# Patient Record
Sex: MALE | Race: WHITE | Employment: FULL TIME | URBAN - METROPOLITAN AREA
[De-identification: names, ages, dates, MRNs, and addresses within clinical notes are randomized per-mention and may not be internally consistent; named-entity substitution may affect disease eponyms.]

---

## 2021-07-01 ENCOUNTER — ANCILLARY PROCEDURE (OUTPATIENT)
Dept: GENERAL RADIOLOGY | Facility: CLINIC | Age: 26
End: 2021-07-01
Attending: PHYSICIAN ASSISTANT
Payer: OTHER MISCELLANEOUS

## 2021-07-01 ENCOUNTER — OFFICE VISIT (OUTPATIENT)
Dept: URGENT CARE | Facility: URGENT CARE | Age: 26
End: 2021-07-01
Payer: OTHER MISCELLANEOUS

## 2021-07-01 ENCOUNTER — TELEPHONE (OUTPATIENT)
Dept: URGENT CARE | Facility: URGENT CARE | Age: 26
End: 2021-07-01

## 2021-07-01 VITALS
BODY MASS INDEX: 18.61 KG/M2 | OXYGEN SATURATION: 100 % | SYSTOLIC BLOOD PRESSURE: 122 MMHG | WEIGHT: 145 LBS | DIASTOLIC BLOOD PRESSURE: 67 MMHG | TEMPERATURE: 99 F | HEIGHT: 74 IN | HEART RATE: 76 BPM

## 2021-07-01 DIAGNOSIS — S46.811A STRAIN OF RIGHT SUBSCAPULARIS MUSCLE, INITIAL ENCOUNTER: ICD-10-CM

## 2021-07-01 DIAGNOSIS — M54.9 MID BACK PAIN ON RIGHT SIDE: Primary | ICD-10-CM

## 2021-07-01 PROCEDURE — 99214 OFFICE O/P EST MOD 30 MIN: CPT | Performed by: PHYSICIAN ASSISTANT

## 2021-07-01 PROCEDURE — 72070 X-RAY EXAM THORAC SPINE 2VWS: CPT | Performed by: RADIOLOGY

## 2021-07-01 RX ORDER — METHOCARBAMOL 750 MG/1
750 TABLET, FILM COATED ORAL 4 TIMES DAILY PRN
Qty: 30 TABLET | Refills: 0 | Status: SHIPPED | OUTPATIENT
Start: 2021-07-01

## 2021-07-01 RX ORDER — NAPROXEN 500 MG/1
500 TABLET ORAL 2 TIMES DAILY WITH MEALS
Qty: 30 TABLET | Refills: 3 | Status: SHIPPED | OUTPATIENT
Start: 2021-07-01 | End: 2022-07-01

## 2021-07-01 ASSESSMENT — MIFFLIN-ST. JEOR: SCORE: 1702.47

## 2021-07-01 NOTE — LETTER
[facility] [address] [address]  [phone]        2021    REPORT OF WORK ABILITY    PATIENT DATA  Employee Name: Eric Hein        : 1995   (Not on file)  Work related injury: YES  Today's date: 2021  Date of injury: 2021     PROVIDER EVALUATION: Please fill in as needed.  Please give copy to employee for employer.  1. Diagnosis: Mid back pain/strain  2. Treatment: ice, ROM exercises, medication  3. Medication: robaxin and naprosyn  NOTE: When ordering a medication, [place] Rules require Work Comp or WC on prescriptions.  4. Return to work date: May return to work without restrictions on .        RESTRICTIONS: Unlimited unless listed.  Restrictions apply to home and leisure also.  If work within restrictions is not available, the employee is totally disabled.  Provider comments:   Medical Examiner: Segun Barrera, [facility] PA-C          Next appointment: As needed    CC: Employer, Managed Care Plan/Payor, Patient

## 2021-07-01 NOTE — LETTER
[facility] [address] [address]  [phone]        2021    REPORT OF WORK ABILITY    PATIENT DATA  Employee Name: Eric Hein        : 1995   (Not on file)  Work related injury: YES  Today's date: 2021  Date of injury: 2021     PROVIDER EVALUATION: Please fill in as needed.  Please give copy to employee for employer.  1. Diagnosis: Mid back pain/strain  2. Treatment: ice, ROM exercises, medication  3. Medication: robaxin and naprosyn  NOTE: When ordering a medication, [place] Rules require [place] Comp or WC on prescriptions.  4. Return to work date: May return to work with restrictions on        RESTRICTIONS: Unlimited unless listed.  Restrictions apply to home and leisure also.  If work within restrictions is not available, the employee is totally disabled.  Provider comments:   Medical Examiner: Segun Barrera, [facility] PA-C          Next appointment: As needed    CC: Employer, Managed Care Plan/Payor, Patient

## 2021-07-01 NOTE — PROGRESS NOTES
"    Assessment & Plan     Mid back pain on right side  Ice compresses  ROM exercises  Naprosyn for inflammation  - naproxen (NAPROSYN) 500 MG tablet; Take 1 tablet (500 mg) by mouth 2 times daily (with meals)  - methocarbamol (ROBAXIN) 750 MG tablet; Take 1 tablet (750 mg) by mouth 4 times daily as needed    Strain of right subscapularis muscle, initial encounter  Xray Negative for acute findings, read by Segun LINDA at time of visit.  Naprosyn for inflammation  Robaxin for muscle tension  Work comp letter written for patient  - XR Thoracic Spine 2 Views  - naproxen (NAPROSYN) 500 MG tablet; Take 1 tablet (500 mg) by mouth 2 times daily (with meals)  - methocarbamol (ROBAXIN) 750 MG tablet; Take 1 tablet (750 mg) by mouth 4 times daily as needed       Follow up with PCP if symptoms persist    Segun Barrera PA-C  [facility] NADIA Reyes is a 25 year old who presents for the following health issues     HPI     Work comp injury  Right mid back strain, tenderness    Review of Systems   Constitutional, HEENT, cardiovascular, pulmonary, gi and gu systems are negative, except as otherwise noted.      Objective    /67   Pulse 76   Temp 99  F (37.2  C)   Ht 1.88 m (6' 2\")   Wt 65.8 kg (145 lb)   SpO2 100%   BMI 18.62 kg/m    Body mass index is 18.62 kg/m .  Physical Exam   GENERAL: healthy, alert and no distress  NECK: no adenopathy, no asymmetry, masses, or scars and thyroid normal to palpation  MS: Positive for side mid back tenderness, localized pain, tightness  SKIN: no suspicious lesions or rashes  NEURO: Normal strength and tone, mentation intact and speech normal  PSYCH: mentation appears normal, affect normal/bright    Dorsal xray Negative for acute findings, read by Segun LINDA at time of visit.              "

## 2021-07-01 NOTE — PATIENT INSTRUCTIONS
Patient Education     Shoulder, Upper Back, and Arm Exercise: Overhead Arm Stretch   To start, stand tall with your ears, shoulders, and hips in line. Your feet should be slightly apart, positioned just under your hips. Focus your eyes directly in front of you. Stay in this position for a few seconds before starting the exercise. This helps increase your awareness of proper posture. You can also do this exercise sitting up in a sturdy chair. Before doing this exercise, talk with your healthcare provider to make sure it's safe for you to do.        Reach overhead and slightly back with both arms. Keep your shoulders and neck aligned and your elbows behind your shoulders:     With your palms facing the ceiling, turn your fingers inward. You can also do this exercise holding weights if directed by your healthcare provider or physical therapist.    Take a deep breath. Breathe out and lower your elbows toward your buttocks. Hold for up to 5 seconds, then return to starting position.    Repeat 3 times, or as directed by your healthcare provider or physical therapist.  [employer] last reviewed this educational content on 7/1/2020 2000-2021 [employer]. All rights reserved. This information is not intended as a substitute for professional medical care. Always follow your healthcare professional's instructions.           Patient Education     Shoulder Squeeze Exercise    To start, sit in a chair with your feet flat on the floor. Shift your weight slightly forward so you don't round your back. Relax. Keep your ears, shoulders, and hips aligned:    Raise your arms to shoulder height, elbows bent and palms forward.    Move your arms back, squeezing your shoulder blades together.    Hold for 10 seconds. Return to starting position.     Repeat 5 times.   For your safety, check with your healthcare provider before starting an exercise program.  [employer] last reviewed this educational content on 7/1/2020     7451-3324 [employer]. All rights reserved. This information is not intended as a substitute for professional medical care. Always follow your healthcare professional's instructions.           Patient Education     Quadruped Arm-Reach Exercise       Do this exercise on your hands and knees. Keep your knees under your hips and your hands under your shoulders. Keep your spine in a neutral position (not arched or sagging). Keep your ears in line with your shoulders. Hold for a few seconds before starting the exercise:  1. Tighten your belly muscles (core) and raise 1 arm straight in front of you, palm down. Hold for 5 seconds, then lower. Repeat 5 times.  2. Do the exercise again, this time lifting your arm to the side. Repeat 5 times.  3. Do the exercise again, this time lifting your arm backward, palm up. Repeat 5 times.  Switch sides and do each exercise with the other arm.  Note: This exercise may not be advised after certain shoulder surgeries. Talk with your healthcare provider before doing it.  [facility] last reviewed this educational content on 7/1/2020 2000-2021 [employer]. All rights reserved. This information is not intended as a substitute for professional medical care. Always follow your healthcare professional's instructions.

## 2021-08-22 ENCOUNTER — HEALTH MAINTENANCE LETTER (OUTPATIENT)
Age: 26
End: 2021-08-22

## 2021-10-17 ENCOUNTER — HEALTH MAINTENANCE LETTER (OUTPATIENT)
Age: 26
End: 2021-10-17

## 2022-10-03 ENCOUNTER — HEALTH MAINTENANCE LETTER (OUTPATIENT)
Age: 27
End: 2022-10-03

## 2023-01-26 NOTE — TELEPHONE ENCOUNTER
"Pt was in UC today and saw Dr. Barrera. He got a letter for work but needs it revised. Pt needs letter to say specifically no restrictions. Note given today has vague instructions about restrictions and work will not accept.   Please say \"ok to return to work on Tuesday 7/6 with no restrictions, or 100% able to return to work.\" if appropriate.    Email to [email or web address]    Phone [phone]  " None

## 2023-10-21 ENCOUNTER — HEALTH MAINTENANCE LETTER (OUTPATIENT)
Age: 28
End: 2023-10-21